# Patient Record
(demographics unavailable — no encounter records)

---

## 2025-01-27 NOTE — HISTORY OF PRESENT ILLNESS
[FreeTextEntry1] : He is now retired but does manage to do now a more moderate amount of exercise in the gym, bicycling and some walks with his wife; except so far in the cold weather he has cut back on his physical activities and biking;      Carotid duplex study from June 27, 2024 demonstrated normal flow bilaterally without any evidence of atherosclerotic plaquing or stenosis-i.e. normal study;  Transthoracic echocardiogram from 6/27/2024 demonstrating borderline LVH with preserved LV systolic function and normal EF in the range of 60%; there was trace MR, trace to mild AI, and no pericardial effusion.  Normal aortic dimensions;     Cardiac event monitor performed from March 4 to 10th (2022)- demonstrated normal sinus rhythm with frequent PACs and frequent atrial bigeminy noted; otherwise no significant arrhythmia was seen; average heart rate was 69 beats per minute;  Transthoracic echocardiogram demonstrated normal left ventricular size and systolic function with normal ejection fraction of 60%.There was normal aortic dimensions.There was trace AI and mild focal aortic sclerosis without stenosis. No pericardial effusion;  Last nuclear stress test was done June 21, 2021 and was negative for ischemia on the myocardial perfusion images with normal LV systolic function.  And only occasional PVCs and PACs were seen;

## 2025-01-27 NOTE — REASON FOR VISIT
[Arrhythmia/ECG Abnorrmalities] : arrhythmia/ECG abnormalities [Structural Heart and Valve Disease] : structural heart and valve disease [FreeTextEntry3] : DARBY FONTANA [FreeTextEntry1] : The patient is a 65-year-old white male who has a history for abnormal EKG (RBBB)- and some intermittent palpitations of the chest found to be occasional PVCs and PACs bigeminy;  At one point but seemed to be helped by decreasing caffeinated products and cutting back on chocolate, coffee etc.;  Patient presents to the office today for general cardiac checkup;  Overall , he reports he has been feeling well without any significant chest pain, shortness of breath, dizziness or syncope; he rarely still gets palpitations;--but this has not bothered him much;

## 2025-01-27 NOTE — ASSESSMENT
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 No acute changes. No ectopic beats; essentially unchanged;  In summary, this 66-year-old gentleman has had a history for occasional symptomatic PACs in the past --but lately appears to be much improved;  Recent cardiovascular testing including echocardiogram and carotid duplex study appears stable;  Last nuclear stress test in 2021 was negative for ischemia on the myocardial perfusion images;  He has been doing a moderate amount of physical activity and exercise and generally feels well with this; patient is on no prescription medication;  Presently, hemodynamically stable;    Plan:  Patient reassured;  Agree with continuing to be mindful of caffeinated products and alcohol as possible  "triggers", including patient's anxiety and stress level as contributing cause;  Encouraged to continue regular exercise program to improve functional capacity and cardiovascular conditioning as well as reducing stress;  Have discussed possibility of repeating his cardiac stress test sometime later this year and may discuss at next visit to schedule;  Follow up to office within 6 months or p.r.n.;  Recommend timely checkups and laboratory blood tests with primary care encouraged;

## 2025-07-10 NOTE — REASON FOR VISIT
[Arrhythmia/ECG Abnorrmalities] : arrhythmia/ECG abnormalities [Structural Heart and Valve Disease] : structural heart and valve disease [FreeTextEntry3] : DARBY FONTANA [FreeTextEntry1] : The patient is a 67-year-old white male who has a remote history for abnormal EKG (RBBB)- and some intermittent palpitations of the chest found to be occasional PVCs and PACs bigeminy;  At one point but seemed to be helped by decreasing caffeinated products and cutting back on chocolate, coffee etc.;  Patient presents to the office today for general cardiac checkup;  He has been experiencing occasional exertional dyspnea but still manages to do some modest physical activity and exercise; there has been no dizziness, palpitations or syncope

## 2025-07-10 NOTE — HISTORY OF PRESENT ILLNESS
[FreeTextEntry1] : He is now retired but does manage to do now a more moderate amount of exercise in the gym, bicycling and some walks;      Carotid duplex study from June 27, 2024 demonstrated normal flow bilaterally without any evidence of atherosclerotic plaquing or stenosis-i.e. normal study;  Transthoracic echocardiogram from 6/27/2024 demonstrating borderline LVH with preserved LV systolic function and normal EF in the range of 60%; there was trace MR, trace to mild AI, and no pericardial effusion.  Normal aortic dimensions;     Cardiac event monitor performed from March 4 to 10th (2022)- demonstrated normal sinus rhythm with frequent PACs and frequent atrial bigeminy noted; otherwise no significant arrhythmia was seen; average heart rate was 69 beats per minute;  Transthoracic echocardiogram demonstrated normal left ventricular size and systolic function with normal ejection fraction of 60%.There was normal aortic dimensions.There was trace AI and mild focal aortic sclerosis without stenosis. No pericardial effusion;  Last nuclear stress test was done June 21, 2021 and was negative for ischemia on the myocardial perfusion images with normal LV systolic function.  And only occasional PVCs and PACs were seen;